# Patient Record
Sex: MALE | Race: OTHER | URBAN - METROPOLITAN AREA
[De-identification: names, ages, dates, MRNs, and addresses within clinical notes are randomized per-mention and may not be internally consistent; named-entity substitution may affect disease eponyms.]

---

## 2024-05-09 ENCOUNTER — NURSE ONLY (OUTPATIENT)
Dept: INTERNAL MEDICINE CLINIC | Facility: HOSPITAL | Age: 24
End: 2024-05-09
Attending: EMERGENCY MEDICINE

## 2024-05-09 DIAGNOSIS — Z00.00 WELLNESS EXAMINATION: Primary | ICD-10-CM

## 2024-05-09 LAB
RUBV IGG SER QL: POSITIVE
RUBV IGG SER-ACNC: 18.4 IU/ML (ref 10–?)

## 2024-05-09 PROCEDURE — 86480 TB TEST CELL IMMUN MEASURE: CPT

## 2024-05-09 PROCEDURE — 86735 MUMPS ANTIBODY: CPT

## 2024-05-09 PROCEDURE — 86765 RUBEOLA ANTIBODY: CPT

## 2024-05-09 PROCEDURE — 86787 VARICELLA-ZOSTER ANTIBODY: CPT

## 2024-05-09 PROCEDURE — 86762 RUBELLA ANTIBODY: CPT

## 2024-05-10 LAB
M TB IFN-G CD4+ T-CELLS BLD-ACNC: 0.06 IU/ML
M TB TUBERC IFN-G BLD QL: NEGATIVE
M TB TUBERC IGNF/MITOGEN IGNF CONTROL: >10 IU/ML
MEV IGG SER-ACNC: 6.2 AU/ML (ref 16.5–?)
MUV IGG SER IA-ACNC: 11.7 AU/ML (ref 11–?)
QFT TB1 AG MINUS NIL: 0.11 IU/ML
QFT TB2 AG MINUS NIL: 0.04 IU/ML
VZV IGG SER IA-ACNC: 205.1 (ref 165–?)

## 2024-11-05 ENCOUNTER — OFFICE VISIT (OUTPATIENT)
Dept: OCCUPATIONAL MEDICINE | Age: 24
End: 2024-11-05
Attending: NURSE PRACTITIONER

## 2024-11-05 DIAGNOSIS — S91.332A PUNCTURE WOUND OF LEFT FOOT, INITIAL ENCOUNTER: Primary | ICD-10-CM

## 2024-11-05 RX ORDER — CIPROFLOXACIN 750 MG/1
750 TABLET, FILM COATED ORAL 2 TIMES DAILY
Qty: 14 TABLET | Refills: 0 | Status: SHIPPED | OUTPATIENT
Start: 2024-11-05 | End: 2024-11-12

## 2024-11-18 ENCOUNTER — HOSPITAL ENCOUNTER (OUTPATIENT)
Dept: GENERAL RADIOLOGY | Age: 24
Discharge: HOME | End: 2024-11-18
Attending: NURSE PRACTITIONER

## 2024-11-18 ENCOUNTER — OFFICE VISIT (OUTPATIENT)
Dept: OCCUPATIONAL MEDICINE | Age: 24
End: 2024-11-18
Attending: NURSE PRACTITIONER

## 2024-11-18 DIAGNOSIS — M25.511 ACUTE PAIN OF RIGHT SHOULDER: ICD-10-CM

## 2024-11-18 DIAGNOSIS — M25.511 ACUTE PAIN OF RIGHT SHOULDER: Primary | ICD-10-CM

## 2024-11-18 PROCEDURE — 73030 X-RAY EXAM OF SHOULDER: CPT | Performed by: NURSE PRACTITIONER

## 2024-11-18 RX ORDER — IBUPROFEN 600 MG/1
600 TABLET, FILM COATED ORAL EVERY 6 HOURS
Qty: 28 TABLET | Refills: 0 | Status: SHIPPED | OUTPATIENT
Start: 2024-11-18 | End: 2024-11-25

## 2024-11-25 ENCOUNTER — APPOINTMENT (OUTPATIENT)
Dept: OCCUPATIONAL MEDICINE | Age: 24
End: 2024-11-25
Attending: NURSE PRACTITIONER

## 2025-03-11 ENCOUNTER — HOSPITAL ENCOUNTER (OUTPATIENT)
Age: 25
Discharge: HOME | End: 2025-03-11
Payer: COMMERCIAL

## 2025-03-11 VITALS
RESPIRATION RATE: 18 BRPM | OXYGEN SATURATION: 98 % | DIASTOLIC BLOOD PRESSURE: 78 MMHG | SYSTOLIC BLOOD PRESSURE: 130 MMHG | TEMPERATURE: 98 F | HEART RATE: 86 BPM

## 2025-03-11 DIAGNOSIS — L60.0 INGROWN NAIL OF GREAT TOE: Primary | ICD-10-CM

## 2025-03-11 DIAGNOSIS — L03.031 PARONYCHIA, TOE, RIGHT: ICD-10-CM

## 2025-03-11 PROCEDURE — 99203 OFFICE O/P NEW LOW 30 MIN: CPT | Performed by: PHYSICIAN ASSISTANT

## 2025-03-11 RX ORDER — IBUPROFEN 600 MG/1
600 TABLET, FILM COATED ORAL EVERY 8 HOURS PRN
Qty: 15 TABLET | Refills: 0 | Status: SHIPPED | OUTPATIENT
Start: 2025-03-11 | End: 2025-03-16

## 2025-03-11 RX ORDER — MUPIROCIN 20 MG/G
1 OINTMENT TOPICAL 2 TIMES DAILY
Qty: 1 G | Refills: 0 | Status: SHIPPED | OUTPATIENT
Start: 2025-03-11 | End: 2025-03-21

## 2025-03-11 RX ORDER — CEPHALEXIN 500 MG/1
500 CAPSULE ORAL 2 TIMES DAILY
Qty: 14 CAPSULE | Refills: 0 | Status: SHIPPED | OUTPATIENT
Start: 2025-03-11 | End: 2025-03-18

## 2025-03-11 NOTE — ED PROVIDER NOTES
Chief Complaint   Patient presents with    Ingrown Toenail       HPI:     Abiel Ward is a 24 year old male who presents for evaluation of right great toe pain over the last few days without injury or trauma, notes swelling along the medial aspect of the cuticle.  Denies history of ingrown toenails paronychia or MRSA.  Denies any topical or oral medication since onset, pain is a 5 out of 10, denies contraindication to ibuprofen requesting prescription for home.  Denies numbness or tingling fevers or chills.      PFSH    PFSH asessment screens reviewed and agree.  Nurses notes reviewed I agree with documentation.    No family history on file.  Family history reviewed with patient/caregiver and is not pertinent to presenting problem.  Social History     Socioeconomic History    Marital status: Single     Spouse name: Not on file    Number of children: Not on file    Years of education: Not on file    Highest education level: Not on file   Occupational History    Not on file   Tobacco Use    Smoking status: Not on file    Smokeless tobacco: Not on file   Substance and Sexual Activity    Alcohol use: Not on file    Drug use: Not on file    Sexual activity: Not on file   Other Topics Concern    Not on file   Social History Narrative    Not on file     Social Drivers of Health     Food Insecurity: Not on file   Transportation Needs: Not on file   Housing Stability: Not on file         ROS:   Positive for stated complaint: Toe pain.  All other systems reviewed and negative except as noted above.  Constitutional and Vital Signs Reviewed.      Physical Exam:     Findings:    /78   Pulse 86   Temp 98.1 °F (36.7 °C) (Oral)   Resp 18   SpO2 98%   GENERAL: well developed, well nourished, well hydrated, no distress  SKIN: good skin turgor, no obvious rashes  EXTREMITIES: Mild erythema along the right great toe medial cuticle region, nailbed intact.  No dehiscence.  No  felon. VICTOR without difficulty  HEAD:  normocephalic, atraumatic  EYES: sclera non icteric bilateral, conjunctiva clear  NEURO: No focal deficits  PSYCH: Alert and oriented x3.  Answering questions appropriately.  Mood appropriate.    MDM/Assessment/Plan:   Orders for this encounter:    Orders Placed This Encounter    mupirocin 2 % External Ointment     Sig: Apply 1 Application topically 2 (two) times daily for 10 days.     Dispense:  1 g     Refill:  0    cephALEXin 500 MG Oral Cap     Sig: Take 1 capsule (500 mg total) by mouth 2 (two) times daily for 7 days.     Dispense:  14 capsule     Refill:  0    ibuprofen 600 MG Oral Tab     Sig: Take 1 tablet (600 mg total) by mouth every 8 (eight) hours as needed for Pain.     Dispense:  15 tablet     Refill:  0       Labs performed this visit:  No results found for this or any previous visit (from the past 10 hours).    MDM:  Patient instructed via translation recommendation for topical and oral antibiotic and outpatient follow-up with podiatry and after course next week or following for further evaluation of potential excision at clinical discretion.  Happy with plan of care, ibuprofen provided for pain.    Diagnosis:    ICD-10-CM    1. Ingrown nail of great toe  L60.0       2. Paronychia, toe, right  L03.031           All results reviewed and discussed with patient.  See AVS for detailed discharge instructions for your condition today.    Follow Up with:  Donnie Cordova DPM  [address] [facility] [address]  [phone]    Schedule an appointment as soon as possible for a visit in 1 week  PODIATRY REFERRAL

## 2025-03-14 ENCOUNTER — HOSPITAL ENCOUNTER (OUTPATIENT)
Age: 25
Discharge: HOME | End: 2025-03-14
Payer: COMMERCIAL

## 2025-03-14 VITALS
OXYGEN SATURATION: 99 % | RESPIRATION RATE: 18 BRPM | DIASTOLIC BLOOD PRESSURE: 72 MMHG | SYSTOLIC BLOOD PRESSURE: 153 MMHG | HEART RATE: 106 BPM | TEMPERATURE: 98 F

## 2025-03-14 DIAGNOSIS — B34.9 VIRAL SYNDROME: Primary | ICD-10-CM

## 2025-03-14 LAB
POCT INFLUENZA A: NEGATIVE
POCT INFLUENZA B: NEGATIVE
S PYO AG THROAT QL: NEGATIVE

## 2025-03-14 NOTE — ED PROVIDER NOTES
Patient Seen in: [facility]      History     Chief Complaint   Patient presents with    Nasal Congestion    Cough    Stomach Pain    Body ache and/or chills     Stated Complaint: flu like sym    Subjective:   HPI    24-year-old male presenting for evaluation of URI symptoms which started this morning.  Patient reports body aches, congestion, chills as well as a cough and upset stomach.  He denies fevers, chest pain or shortness of breath. Has not taken anything for the symptoms.     Objective:     History reviewed. No pertinent past medical history.           No pertinent past surgical history.              No pertinent social history.            Review of Systems    Positive for stated complaint: flu like sym  Other systems are as noted in HPI.  Constitutional and vital signs reviewed.      All other systems reviewed and negative except as noted above.    Physical Exam     ED Triage Vitals [03/14/25 1404]   /72   Pulse 106   Resp 18   Temp 98.1 °F (36.7 °C)   Temp src Oral   SpO2 99 %   O2 Device None (Room air)       Current Vitals:   Vital Signs  BP: 153/72  Pulse: 106  Resp: 18  Temp: 98.1 °F (36.7 °C)  Temp src: Oral    Oxygen Therapy  SpO2: 99 %  O2 Device: None (Room air)        Physical Exam  Vitals and nursing note reviewed.   Constitutional:       General: He is not in acute distress.  HENT:      Head: Normocephalic and atraumatic.      Right Ear: Tympanic membrane and external ear normal.      Left Ear: Tympanic membrane and external ear normal.      Nose: Rhinorrhea present.      Mouth/Throat:      Mouth: Mucous membranes are moist.      Pharynx: Posterior oropharyngeal erythema present.   Eyes:      Extraocular Movements: Extraocular movements intact.      Pupils: Pupils are equal, round, and reactive to light.   Cardiovascular:      Rate and Rhythm: Normal rate.   Pulmonary:      Effort: Pulmonary effort is normal.      Breath sounds: No wheezing.   Abdominal:      General: Abdomen  is flat.   Musculoskeletal:         General: Normal range of motion.      Cervical back: Normal range of motion.   Skin:     General: Skin is warm.   Neurological:      General: No focal deficit present.      Mental Status: He is alert and oriented to person, place, and time.   Psychiatric:         Mood and Affect: Mood normal.         Behavior: Behavior normal.             ED Course     Labs Reviewed   POCT RAPID STREP - Normal   POCT FLU TEST - Normal    Narrative:     This assay is a rapid molecular in vitro test utilizing nucleic acid amplification of influenza A and B viral RNA.         23 yo male here with c/o URI symptoms which started this morning. Pt afebrile in IC and non-toxic appearing. Lungs clear without wheezing. No hypoxia, no tachycardia.    Ddx- viral syndrome, influenza, strep    Strep, flu are negative.  We discussed supportive care, OTC meds.  PCP follow-up and return precautions reviewed           MDM              Medical Decision Making      Disposition and Plan     Clinical Impression:  1. Viral syndrome         Disposition:  Discharge  3/14/2025  2:51 pm    Follow-up:  No follow-up provider specified.        Medications Prescribed:  Discharge Medication List as of 3/14/2025  2:53 PM              Supplementary Documentation:

## 2025-03-31 ENCOUNTER — OFFICE VISIT (OUTPATIENT)
Dept: PODIATRY CLINIC | Facility: CLINIC | Age: 25
End: 2025-03-31

## 2025-03-31 DIAGNOSIS — L03.032 PARONYCHIA OF TOE OF LEFT FOOT: Primary | ICD-10-CM

## 2025-03-31 DIAGNOSIS — M79.675 PAIN OF TOE OF LEFT FOOT: ICD-10-CM

## 2025-03-31 PROCEDURE — 99203 OFFICE O/P NEW LOW 30 MIN: CPT | Performed by: PODIATRIST

## 2025-03-31 PROCEDURE — 11750 EXCISION NAIL&NAIL MATRIX: CPT | Performed by: PODIATRIST

## 2025-03-31 RX ORDER — MUPIROCIN 20 MG/G
OINTMENT TOPICAL
Qty: 30 G | Refills: 0 | Status: SHIPPED | OUTPATIENT
Start: 2025-03-31

## 2025-03-31 NOTE — PROGRESS NOTES
Abiel Ward is a 24 year old male.   Chief Complaint   Patient presents with    Ingrown Toenail     Consult - left great toe ingrown - onset 2 weeks - pt states he was seen at  on 03/11 because his toe was infected - was given medication and it seems to have heeled - pain rated 6/10 on and off with pressure - no drainage - some swelling - will be using  #[id number] Murtaza          HPI:   Patient interpreted  Was used for this visit.  He is complaining of a painful ingrown toenail he went to an urgent care was placed on antibiotics but has since finished them.  He presents today pointing to the lateral nail border on his left great toe.  At today's visit reviewed nurse's history as taken above, allergies medications and medical history as documented below.  All changes duly noted  Allergies: Patient has no known allergies.   Current Outpatient Medications   Medication Sig Dispense Refill    mupirocin 2 % External Ointment Apply a small amount to the affected nail edge twice daily after soaking and cover with a Band-Aid, 30 g 0    amoxicillin clavulanate 875-125 MG Oral Tab Take 1 tablet by mouth 2 (two) times daily. 20 tablet 0      History reviewed. No pertinent past medical history.   History reviewed. No pertinent surgical history.   History reviewed. No pertinent family history.   Social History     Socioeconomic History    Marital status: Single           REVIEW OF SYSTEMS:   Today reviewed systens as documented below  GENERAL HEALTH: feels well otherwise  SKIN: Refer to exam below  RESPIRATORY: denies shortness of breath with exertion  CARDIOVASCULAR: denies chest pain on exertion  GI: denies abdominal pain and denies heartburn  NEURO: denies headaches    EXAM:   There were no vitals taken for this visit.  GENERAL: well developed, well nourished, in no apparent distress  EXTREMITIES:   1. Integument: The skin on his left foot was evaluated its warm and dry the left hallux nail is ingrowing  with paronychia formation there is erythema edema at the tip of the toe and its painful to palpation.   2. Vascular: Patient has palpable pulses on the   3. Neurologic: Patient has intact sensorium on the lower   4. Musculoskeletal: Patient has good muscle strength and is ambulatory    ASSESSMENT AND PLAN:   Diagnoses and all orders for this visit:    Paronychia of toe of left foot    Pain of toe of left foot    Other orders  -     mupirocin 2 % External Ointment; Apply a small amount to the affected nail edge twice daily after soaking and cover with a Band-Aid,  -     amoxicillin clavulanate 875-125 MG Oral Tab; Take 1 tablet by mouth 2 (two) times daily.        Plan: We reviewed different treatments but I consented him using the  for a phenol and alcohol chemical matrixectomy on the lateral nail border of his left hallux.  The procedure was described to him the possible risks complications reviewed questions were invited and answered.  Guarantees or assurances were not offered the patient wished to proceed and the consent was signed.    Preprocedure diagnosis: Ingrowing toenail with paronychia lateral left hallux  Post procedure diagnosis: Same  Procedure: Partial onychoplasty lateral nail border left hallux    Technique: Appropriate timeout was taken.  The left hallux was anesthetized with 5 cc of 0.5% Marcaine plain then prepped and draped using usual aseptic technique.  Hemostasis was achieved at the base of the toe with a toe tourniquet.  The lateral nail border of the left hallux was removed using appropriate technique.  The nail matrix area was curetted to the see if there were any remaining spicules and none were noted.  The nail matrix then received 2 consecutive, 60 seconds, applications of full strength phenol using Pedinol phenol sticks.  All areas that received phenol were then flushed with copious amounts of alcohol.  The affected toes were dressed with antibiotic ointment gauze and a  lightly applied Coban wrap for compression.  Patient was placed on antibiotics for period of 1 week will begin warm soapy soaks tomorrow instructions dispensed follow-up in 2 weeks  Patient will begin soaking tomorrow.  The patient indicates understanding of these issues and agrees to the plan.    Donnie Cordova DPM

## 2025-04-15 ENCOUNTER — OFFICE VISIT (OUTPATIENT)
Dept: PODIATRY CLINIC | Facility: CLINIC | Age: 25
End: 2025-04-15

## 2025-04-15 DIAGNOSIS — Z98.890 STATUS POST NAIL SURGERY: Primary | ICD-10-CM

## 2025-04-15 PROCEDURE — 99213 OFFICE O/P EST LOW 20 MIN: CPT | Performed by: PODIATRIST

## 2025-04-15 NOTE — PROGRESS NOTES
Abiel Ward is a 24 year old male.   Chief Complaint   Patient presents with    Ingrown Toenail     Left big toe f/u after procedure - language line called and spoke with Ed ID#[id number] - states he is better but still has a little pain rated as 3/10 on and off - has drainage -          HPI:   Patient returns to the clinic now 2 weeks status post procedure still has little pain and drainage.  Patient states he is on his feet for 12 hours every day at work.  At today's visit reviewed nurse's history as taken above, allergies medications and medical history as documented below.  All changes duly noted  Allergies: Patient has no known allergies.   Current Medications[1]   Past Medical History[2]   Past Surgical History[3]   Family History[4] At today's visit gave him instructions to cleanse it daily when he takes a shower bath use a soft toothbrush apply antibiotic ointment and a Band-Aid and soak it in the evening when he gets home from work and put the antibiotic ointment and a Band-Aid on it and continue to do this until all drainage stops.   Social Hx on file[5]        REVIEW OF SYSTEMS:   Today reviewed systens as documented below  GENERAL HEALTH: feels well otherwise  SKIN: Refer to exam below  RESPIRATORY: denies shortness of breath with exertion  CARDIOVASCULAR: denies chest pain on exertion  GI: denies abdominal pain and denies heartburn  NEURO: denies headaches    EXAM:   There were no vitals taken for this visit.  GENERAL: well developed, well nourished, in no apparent distress  EXTREMITIES:   1. Integument: The skin on his left hallux was evaluated is warm and dry still has a little drainage   2. Vascular: Patient has palpable pulses good capillary turn to the left hallux   3. Neurologic: Patient has intact sensation   4. Musculoskeletal: Patient has good muscle strength he is ambulatory    ASSESSMENT AND PLAN:   Diagnoses and all orders for this visit:    Status post nail surgery        Plan:  Daily washing when he takes a shower bath with the soft toothbrush he can clean out the side put antibiotic ointment and a Band-Aid on it when he gets home from work he will soak it apply antibiotic ointment and a Band-Aid continue doing this until all drainage stops follow-up as needed.    The patient indicates understanding of these issues and agrees to the plan.    Donnie Cordova DPM         [1]   Current Outpatient Medications   Medication Sig Dispense Refill    mupirocin 2 % External Ointment Apply a small amount to the affected nail edge twice daily after soaking and cover with a Band-Aid, 30 g 0    amoxicillin clavulanate 875-125 MG Oral Tab Take 1 tablet by mouth 2 (two) times daily. 20 tablet 0   [2] History reviewed. No pertinent past medical history.  [3] History reviewed. No pertinent surgical history.  [4] History reviewed. No pertinent family history.  [5]   Social History  Socioeconomic History    Marital status: Single   Tobacco Use    Smoking status: Never    Smokeless tobacco: Never

## (undated) NOTE — LETTER
AUTHORIZATION FOR SURGICAL OPERATION OR OTHER PROCEDURE    1. I hereby authorize Dr. Donnie Cordova , and [facility] staff assigned to my case to perform the following operation and/or procedure at the [facility] site:    _______________________________________________________________________________________________    PNA Lateral Left   _______________________________________________________________________________________________    2.  My physician has explained the nature and purpose of the operation or other procedure, possible alternative methods of treatment, the risks involved, and the possibility of complication to me.  I acknowledge that no guarantee has been made as to the result that may be obtained.  3.  I recognize that, during the course of this operation, or other procedure, unforseen conditions may necessitate additional or different procedure than those listed above.  I, therefore, further authorize and request that the above named physician, his/her physician assistants or designees perform such procedures as are, in his/her professional opinion, necessary and desirable.  4.  Any tissue or organs removed in the operation or other procedure may be disposed of by and at the discretion of the [facility] and [facility].  5.  I understand that in the event of a medical emergency, I will be transported by local paramedics to [facility] or other hospital emergency department.  6.  I certify that I have read and fully understand the above consent to operation and/or other procedure.    7.  I acknowledge that my physician has explained sedation/analgesia administration to me including the risks and benefits.  I consent to the administration of sedation/analgesia as may be necessary or desirable in the judgement of my physician.    Witness signature: ___________________________________________________ Date:  ______/______/_____                     Time:  ________ A.M.  P.M.       Patient Name:  ______________________________________________________  (please print)      Patient signature:  ___________________________________________________             Relationship to Patient:           []  Parent    Responsible person                          []  Spouse  In case of minor or                    [] Other  _____________   Incompetent name:  __________________________________________________                               (please print)      _____________      Responsible person  In case of minor or  Incompetent signature:  _______________________________________________    Statement of Physician  My signature below affirms that prior to the time of the procedure, I have explained to the patient and/or his/her guardian, the risks and benefits involved in the proposed treatment and any reasonable alternative to the proposed treatment.  I have also explained the risks and benefits involved in the refusal of the proposed treatment and have answered the patient's questions.                        Date:  ______/______/_______  Provider                      Signature:  __________________________________________________________       Time:  ___________ A.M    P.M.

## (undated) NOTE — LETTER
Date & Time: 3/14/2025, 2:51 PM  Patient: Abiel Ward  Encounter Provider(s):    Libra Silvestre PA-C       To Whom It May Concern:    Abiel Ward was seen and treated in our department on 3/14/2025.       If you have any questions or concerns, please do not hesitate to call.        _____________________________  Physician/APC Signature